# Patient Record
Sex: MALE | Race: WHITE
[De-identification: names, ages, dates, MRNs, and addresses within clinical notes are randomized per-mention and may not be internally consistent; named-entity substitution may affect disease eponyms.]

---

## 2019-03-11 ENCOUNTER — HOSPITAL ENCOUNTER (OUTPATIENT)
Dept: HOSPITAL 84 - D.OPS | Age: 79
Discharge: HOME | End: 2019-03-11
Attending: ORTHOPAEDIC SURGERY
Payer: MEDICARE

## 2019-03-11 VITALS — BODY MASS INDEX: 30 KG/M2

## 2019-03-11 DIAGNOSIS — M75.100: Primary | ICD-10-CM

## 2019-03-21 LAB
ANION GAP SERPL CALC-SCNC: 13.7 MMOL/L (ref 8–16)
BASOPHILS NFR BLD AUTO: 0.7 % (ref 0–2)
BUN SERPL-MCNC: 13 MG/DL (ref 7–18)
CALCIUM SERPL-MCNC: 9.2 MG/DL (ref 8.5–10.1)
CHLORIDE SERPL-SCNC: 102 MMOL/L (ref 98–107)
CO2 SERPL-SCNC: 28.6 MMOL/L (ref 21–32)
CREAT SERPL-MCNC: 1 MG/DL (ref 0.6–1.3)
EOSINOPHIL NFR BLD: 2.7 % (ref 0–7)
ERYTHROCYTE [DISTWIDTH] IN BLOOD BY AUTOMATED COUNT: 14.3 % (ref 11.5–14.5)
GLUCOSE SERPL-MCNC: 163 MG/DL (ref 74–106)
HCT VFR BLD CALC: 43.4 % (ref 42–54)
HGB BLD-MCNC: 14.9 G/DL (ref 13.5–17.5)
IMM GRANULOCYTES NFR BLD: 0.1 % (ref 0–5)
LYMPHOCYTES NFR BLD AUTO: 28.6 % (ref 15–50)
MCH RBC QN AUTO: 32.7 PG (ref 26–34)
MCHC RBC AUTO-ENTMCNC: 34.3 G/DL (ref 31–37)
MCV RBC: 95.2 FL (ref 80–100)
MONOCYTES NFR BLD: 9.5 % (ref 2–11)
NEUTROPHILS NFR BLD AUTO: 58.4 % (ref 40–80)
OSMOLALITY SERPL CALC.SUM OF ELEC: 282 MOSM/KG (ref 275–300)
PLATELET # BLD: 203 10X3/UL (ref 130–400)
PMV BLD AUTO: 10 FL (ref 7.4–10.4)
POTASSIUM SERPL-SCNC: 4.3 MMOL/L (ref 3.5–5.1)
RBC # BLD AUTO: 4.56 10X6/UL (ref 4.2–6.1)
SODIUM SERPL-SCNC: 140 MMOL/L (ref 136–145)
WBC # BLD AUTO: 7.3 10X3/UL (ref 4.8–10.8)

## 2019-03-22 ENCOUNTER — HOSPITAL ENCOUNTER (OUTPATIENT)
Dept: HOSPITAL 84 - D.OPS | Age: 79
Discharge: HOME | End: 2019-03-22
Attending: ORTHOPAEDIC SURGERY
Payer: MEDICARE

## 2019-03-22 VITALS — BODY MASS INDEX: 30.07 KG/M2 | HEIGHT: 69 IN | WEIGHT: 203 LBS

## 2019-03-22 VITALS — SYSTOLIC BLOOD PRESSURE: 145 MMHG | DIASTOLIC BLOOD PRESSURE: 67 MMHG

## 2019-03-22 DIAGNOSIS — M75.42: ICD-10-CM

## 2019-03-22 DIAGNOSIS — S43.492A: ICD-10-CM

## 2019-03-22 DIAGNOSIS — M65.812: Primary | ICD-10-CM

## 2019-03-22 DIAGNOSIS — M13.812: ICD-10-CM

## 2019-03-22 DIAGNOSIS — Z01.812: ICD-10-CM

## 2019-03-22 DIAGNOSIS — S46.212A: ICD-10-CM

## 2019-03-22 DIAGNOSIS — M19.012: ICD-10-CM

## 2019-03-22 DIAGNOSIS — M75.122: ICD-10-CM
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
## 2019-03-22 NOTE — NUR
DRESSING CHANGED. 4X4 GAUZE AND ABD PAD ONLY.  SITE IS WELL
APPROXIMATED NO SIGNS OF ACTIVE BLEEDING.